# Patient Record
Sex: MALE | Race: WHITE | NOT HISPANIC OR LATINO | URBAN - METROPOLITAN AREA
[De-identification: names, ages, dates, MRNs, and addresses within clinical notes are randomized per-mention and may not be internally consistent; named-entity substitution may affect disease eponyms.]

---

## 2018-09-07 ASSESSMENT — MIFFLIN-ST. JEOR: SCORE: 2514.65

## 2018-09-09 ENCOUNTER — ANESTHESIA - HEALTHEAST (OUTPATIENT)
Dept: SURGERY | Facility: HOSPITAL | Age: 43
End: 2018-09-09

## 2018-09-10 ENCOUNTER — SURGERY - HEALTHEAST (OUTPATIENT)
Dept: SURGERY | Facility: HOSPITAL | Age: 43
End: 2018-09-10

## 2018-09-10 ASSESSMENT — MIFFLIN-ST. JEOR: SCORE: 2555.47

## 2021-05-28 ENCOUNTER — RECORDS - HEALTHEAST (OUTPATIENT)
Dept: ADMINISTRATIVE | Facility: CLINIC | Age: 46
End: 2021-05-28

## 2021-06-02 VITALS — BODY MASS INDEX: 36.45 KG/M2 | WEIGHT: 315 LBS | HEIGHT: 78 IN

## 2021-06-16 PROBLEM — G47.33 SLEEP APNEA, OBSTRUCTIVE: Status: ACTIVE | Noted: 2018-09-10

## 2021-06-20 NOTE — ANESTHESIA POSTPROCEDURE EVALUATION
Patient: Avi Pappas  ONE PIECE LEFORT ONE OSTEOTOMY AND BILATERAL SAGITTAL SPLIT RAMUS OSTEOTOMY  Anesthesia type: general    Patient location: PACU  Last vitals:   Vitals:    09/10/18 1820   BP: 158/72   Pulse: 68   Resp: 14   Temp:    SpO2: 94%     Post vital signs: stable  Level of consciousness: awake and responds to simple questions  Post-anesthesia pain: pain controlled  Post-anesthesia nausea and vomiting: no  Pulmonary: unassisted, return to baseline  Cardiovascular: stable and blood pressure at baseline  Hydration: adequate  Anesthetic events: no    QCDR Measures:  ASA# 11 - Annabelle-op Cardiac Arrest: ASA11B - Patient did NOT experience unanticipated cardiac arrest  ASA# 12 - Annabelle-op Mortality Rate: ASA12B - Patient did NOT die  ASA# 13 - PACU Re-Intubation Rate: ASA13B - Patient did NOT require a new airway mgmt  ASA# 10 - Composite Anes Safety: ASA10A - No serious adverse event    Additional Notes:

## 2021-06-20 NOTE — ANESTHESIA POSTPROCEDURE EVALUATION
Patient: Avi Pappas  ONE PIECE LEFORT ONE OSTEOTOMY AND BILATERAL SAGITTAL SPLIT RAMUS OSTEOTOMY  Anesthesia type: general    Patient location: PACU  Last vitals:   Vitals:    09/10/18 1945   BP: 145/79   Pulse: 67   Resp: 16   Temp: 37.2  C (98.9  F)   SpO2: 94%     Post vital signs: stable  Level of consciousness: awake and responds to simple questions  Post-anesthesia pain: pain controlled  Post-anesthesia nausea and vomiting: no  Pulmonary: unassisted, return to baseline  Cardiovascular: stable and blood pressure at baseline  Hydration: adequate  Anesthetic events: no    QCDR Measures:  ASA# 11 - Annabelle-op Cardiac Arrest: ASA11B - Patient did NOT experience unanticipated cardiac arrest  ASA# 12 - Annabelle-op Mortality Rate: ASA12B - Patient did NOT die  ASA# 13 - PACU Re-Intubation Rate: ASA13B - Patient did NOT require a new airway mgmt  ASA# 10 - Composite Anes Safety: ASA10A - No serious adverse event    Additional Notes:

## 2021-06-20 NOTE — ANESTHESIA PREPROCEDURE EVALUATION
Anesthesia Evaluation      Patient summary reviewed   No history of anesthetic complications     Airway   Mallampati: II  Neck ROM: full   Pulmonary - normal exam   (+) sleep apnea on CPAP, ,                          Cardiovascular - negative ROS and normal exam  Exercise tolerance: > or = 4 METS   Neuro/Psych - negative ROS     Endo/Other    (+) obesity,      GI/Hepatic/Renal - negative ROS      Other findings: Has used CPAP for past 3 months.  Alleged h/o acute hypercarbic respiratory failure due to RYAN.  H/o herniated lumbar discs, denies issues presently.  Denies TMJ issues.  Has had septoplasty, nares equally patent clinically.   Braces.      Dental - normal exam                        Anesthesia Plan  Planned anesthetic: general endotracheal  Have glidescope available.  Plan nasal ETT, discussed risks with pt.  ASA 3   Induction: intravenous   Anesthetic plan and risks discussed with: patient    Post-op plan: routine recovery

## 2021-06-20 NOTE — ANESTHESIA CARE TRANSFER NOTE
Last vitals:   Vitals:    09/10/18 1721   BP: 172/81   Pulse: 69   Resp: 14   Temp: 37.7  C (99.9  F)   SpO2: 98%     Volatile agents turned off, muscle relaxant reversed, 4/4 twitches with sustained tetany. Pt breathing spontaneously with adequate tidal volumes, following commands, gently suctioned oropharynx, extubated without issue. 10LPM O2 applied via face mask.Transported by CRNA and RN to recovery.      Patient's level of consciousness is drowsy  Spontaneous respirations: yes  Maintains airway independently: yes  Dentition unchanged: yes  Oropharynx: oropharynx clear of all foreign objects    QCDR Measures:  ASA# 20 - Surgical Safety Checklist: WHO surgical safety checklist completed prior to induction  PQRS# 430 - Adult PONV Prevention: 4558F - Pt received => 2 anti-emetic agents (different classes) preop & intraop  ASA# 8 - Peds PONV Prevention: NA - Not pediatric patient, not GA or 2 or more risk factors NOT present  PQRS# 424 - Annabelle-op Temp Management: 4559F - At least one body temp DOCUMENTED => 35.5C or 95.9F within required timeframe  PQRS# 426 - PACU Transfer Protocol: - Transfer of care checklist used  ASA# 14 - Acute Post-op Pain: ASA14B - Patient did NOT experience pain >= 7 out of 10